# Patient Record
Sex: FEMALE | Race: WHITE | NOT HISPANIC OR LATINO | Employment: FULL TIME | URBAN - METROPOLITAN AREA
[De-identification: names, ages, dates, MRNs, and addresses within clinical notes are randomized per-mention and may not be internally consistent; named-entity substitution may affect disease eponyms.]

---

## 2019-08-13 ENCOUNTER — CLINICAL SUPPORT (OUTPATIENT)
Dept: OTHER | Facility: CLINIC | Age: 54
End: 2019-08-13
Payer: COMMERCIAL

## 2019-08-13 DIAGNOSIS — Z00.8 ENCOUNTER FOR OTHER GENERAL EXAMINATION: ICD-10-CM

## 2019-08-13 PROCEDURE — 82947 ASSAY GLUCOSE BLOOD QUANT: CPT | Mod: QW,S$GLB,, | Performed by: INTERNAL MEDICINE

## 2019-08-13 PROCEDURE — 99401 PREV MED CNSL INDIV APPRX 15: CPT | Mod: S$GLB,,, | Performed by: INTERNAL MEDICINE

## 2019-08-13 PROCEDURE — 80061 LIPID PANEL: CPT | Mod: QW,S$GLB,, | Performed by: INTERNAL MEDICINE

## 2019-08-13 PROCEDURE — 82947 PR  ASSAY QUANTITATIVE,BLOOD GLUCOSE: ICD-10-PCS | Mod: QW,S$GLB,, | Performed by: INTERNAL MEDICINE

## 2019-08-13 PROCEDURE — 99401 PR PREVENT COUNSEL,INDIV,15 MIN: ICD-10-PCS | Mod: S$GLB,,, | Performed by: INTERNAL MEDICINE

## 2019-08-13 PROCEDURE — 80061 PR  LIPID PANEL: ICD-10-PCS | Mod: QW,S$GLB,, | Performed by: INTERNAL MEDICINE

## 2019-08-14 VITALS — HEIGHT: 66 IN

## 2019-08-14 LAB
HDLC SERPL-MCNC: 60 MG/DL
POC CHOLESTEROL, LDL (DOCK): 145 MG/DL
POC CHOLESTEROL, TOTAL: 221 MG/DL
POC GLUCOSE, FASTING: 86 MG/DL (ref 60–110)
TRIGL SERPL-MCNC: 81 MG/DL

## 2020-01-26 NOTE — PROGRESS NOTES
Biometrics completed.    Results reviewed with a Registered Nurse; understanding of results and   educational materials was verbalized.  
No

## 2020-10-07 ENCOUNTER — CLINICAL SUPPORT (OUTPATIENT)
Dept: OTHER | Facility: CLINIC | Age: 55
End: 2020-10-07

## 2020-10-07 DIAGNOSIS — Z00.8 ENCOUNTER FOR OTHER GENERAL EXAMINATION: ICD-10-CM

## 2020-10-08 VITALS — HEIGHT: 66 IN

## 2020-10-08 LAB
HDLC SERPL-MCNC: 79 MG/DL
POC CHOLESTEROL, LDL (DOCK): 130 MG/DL
POC CHOLESTEROL, TOTAL: 225 MG/DL
POC GLUCOSE, FASTING: 94 MG/DL (ref 60–110)
TRIGL SERPL-MCNC: 79 MG/DL

## 2020-10-28 NOTE — PROGRESS NOTES
"Patient's BP at event was 168/92. Pt "I know this pressure is going to be high." She is very anxious. Returned in 15 mins BP R arm 142/100. MILAN Garcia RN  Ck BP in L arm 158/104. Advised pt to come back for a recheck between 11AM-11:15AM.  11:10AM /102. Complaining of chest pain. Advised patient to get appointment with her doctor and we can help if she can't get appt.  Will follow up call with patient. --ELIAS White   No consult on site, Results to be sent to patient. -- MILAN Garcia RN  No consult on site. Results and educational materials sent to participant.  I spoke with the patient on the telephone at 12 noon today.  She mentioned that she has not had her BP checked since the screening event. When asked about a PCP she said that she has not seen her doctor in a while and that she would be interested in a new PCP with [facility]. I reviewed the s/s of MI and stroke with the patient and she declines all at this time stating that she is "feeling fine." Informed her of the urgency/emergency if these s/s become present. Normal BP values and ways to improved BP with diet and lifestyle changes discussed.  An appt with a PCP was recommended for this week. I gave the patient the phone number to the [facility]  and have given the patient's information to MICHELINE Camilo with [employer] to assist with PCP selection and appt.    "